# Patient Record
Sex: MALE | Race: AMERICAN INDIAN OR ALASKA NATIVE | NOT HISPANIC OR LATINO | Employment: FULL TIME | URBAN - METROPOLITAN AREA
[De-identification: names, ages, dates, MRNs, and addresses within clinical notes are randomized per-mention and may not be internally consistent; named-entity substitution may affect disease eponyms.]

---

## 2017-11-15 ENCOUNTER — COMMUNICATION - HEALTHEAST (OUTPATIENT)
Dept: TELEHEALTH | Facility: CLINIC | Age: 37
End: 2017-11-15

## 2017-11-15 ENCOUNTER — OFFICE VISIT - HEALTHEAST (OUTPATIENT)
Dept: INTERNAL MEDICINE | Facility: CLINIC | Age: 37
End: 2017-11-15

## 2017-11-15 DIAGNOSIS — M10.9 GOUT, UNSPECIFIED CAUSE, UNSPECIFIED CHRONICITY, UNSPECIFIED SITE: ICD-10-CM

## 2017-11-15 LAB
CHOLEST SERPL-MCNC: 249 MG/DL
FASTING STATUS PATIENT QL REPORTED: YES
HDLC SERPL-MCNC: 55 MG/DL
LDLC SERPL CALC-MCNC: 174 MG/DL
TRIGL SERPL-MCNC: 102 MG/DL

## 2017-11-16 ENCOUNTER — COMMUNICATION - HEALTHEAST (OUTPATIENT)
Dept: INTERNAL MEDICINE | Facility: CLINIC | Age: 37
End: 2017-11-16

## 2020-06-02 ENCOUNTER — COMMUNICATION - HEALTHEAST (OUTPATIENT)
Dept: SCHEDULING | Facility: CLINIC | Age: 40
End: 2020-06-02

## 2020-06-03 ENCOUNTER — OFFICE VISIT - HEALTHEAST (OUTPATIENT)
Dept: INTERNAL MEDICINE | Facility: CLINIC | Age: 40
End: 2020-06-03

## 2020-06-03 DIAGNOSIS — M1A.0710 CHRONIC IDIOPATHIC GOUT INVOLVING TOE OF RIGHT FOOT WITHOUT TOPHUS: ICD-10-CM

## 2020-06-03 DIAGNOSIS — M10.9 URIC ACID ARTHROPATHY: ICD-10-CM

## 2020-06-03 DIAGNOSIS — E55.9 VITAMIN D DEFICIENCY: ICD-10-CM

## 2020-06-03 ASSESSMENT — PATIENT HEALTH QUESTIONNAIRE - PHQ9: SUM OF ALL RESPONSES TO PHQ QUESTIONS 1-9: 2

## 2021-05-27 ASSESSMENT — PATIENT HEALTH QUESTIONNAIRE - PHQ9: SUM OF ALL RESPONSES TO PHQ QUESTIONS 1-9: 2

## 2021-05-31 VITALS — WEIGHT: 199.7 LBS

## 2021-06-08 NOTE — TELEPHONE ENCOUNTER
Patient calls today concerned that he is starting to develop gout flares. States he's had problems with gout flare off and on for about 10 years. His last flare noted in his electronic chart was 11/15/2017. He was seen at an office visit at that time with Dr. Frances for gout in the big toe. Was prescribed allopurinol 300mg daily and Naproxen 2xdaily for 7 days at that time. Patient states he did not understand that he was supposed to take those mediations daily. He has taken them infrequently as needed over the last 2 years whenever he feels he is getting a gout flare, states that has worked well for him.    Today he states he is just about out of the naproxen and allopurinol. He feels like he may be starting to get gout flare in R ankle. He does have some mild pain/tenderness in the R ankle joint at this time. Denies additional symptoms. No fevers noted. Asks to have those medications refilled again.    I advised patient that a visit is usually recommended before prescribing new medications. The provider will assess his symptoms and decide the best plan of care at this time. Scheduled video visit 6/3/20 at 1130am with Dr. Frances.    Verbalizes understanding of nurse advice.    Micki Nicole RN        Reason for Disposition    Patient wants to be seen    Additional Information    Negative: Followed an ankle or foot injury    Negative: Ankle pain is the main symptom    Negative: Entire foot is cool or blue in comparison to other foot    Negative: Purple or black skin on foot or toe    Negative: Red area or streak and fever    Negative: Swollen foot and fever    Negative: Patient sounds very sick or weak to the triager    Negative: SEVERE pain (e.g., excruciating, unable to do any normal activities)    Negative: Looks like a boil, infected sore, deep ulcer, or other infected rash (spreading redness, pus)    Negative: Swollen foot (EXCEPTIONs: localized bump from bunions, calluses, insect bite, sting)     "Negative: Numbness in one foot (i.e., loss of sensation)    Negative: MODERATE pain (e.g., interferes with normal activities, limping) and present > 3 days    Negative: Numbness or tingling in feet and new or increased    Negative: Pain in the big toe joint    Answer Assessment - Initial Assessment Questions  1. ONSET: \"When did the pain start?\"       Gradually been getting worse the past few days. Suspects gout flare.  2. LOCATION: \"Where is the pain located?\"       Right ankle area.  3. PAIN: \"How bad is the pain?\"    (Scale 1-10; or mild, moderate, severe)    -  MILD (1-3): doesn't interfere with normal activities     -  MODERATE (4-7): interferes with normal activities (e.g., work or school) or awakens from sleep, limping     -  SEVERE (8-10): excruciating pain, unable to do any normal activities, unable to walk      Mild to moderate.  4. WORK OR EXERCISE: \"Has there been any recent work or exercise that involved this part of the body?\"       denies  5. CAUSE: \"What do you think is causing the foot pain?\"      Patient states he has recurrent gout. Believes this is a gout flare.  6. OTHER SYMPTOMS: \"Do you have any other symptoms?\" (e.g., leg pain, rash, fever, numbness)      Denies  7. PREGNANCY: \"Is there any chance you are pregnant?\" \"When was your last menstrual period?\"      no    Protocols used: FOOT PAIN-A-[place]      "

## 2021-06-08 NOTE — PROGRESS NOTES
Patient would like the video invitation sent by: Send to e-mail at: [email or web address]      ASSESSMENT:  1. 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  Recommend 1 month trial of allopurinol.  Consider daily allopurinol  - allopurinoL (ZYLOPRIM) 300 MG tablet; Take 1 tablet (300 mg total) by mouth daily.  Dispense: 90 tablet; Refill: 3  - naproxen (NAPROSYN) 500 MG tablet; Take 1 tablet (500 mg total) by mouth 2 (two) times a day with meals.  Dispense: 60 tablet; Refill: 3  - predniSONE (DELTASONE) 20 MG tablet; Take 20 mg by mouth daily.  Dispense: 30 tablet; Refill: 1    2. Uric acid arthropathy  Question component of uric acid arthropathy and other joints  - allopurinoL (ZYLOPRIM) 300 MG tablet; Take 1 tablet (300 mg total) by mouth daily.  Dispense: 90 tablet; Refill: 3  - naproxen (NAPROSYN) 500 MG tablet; Take 1 tablet (500 mg total) by mouth 2 (two) times a day with meals.  Dispense: 60 tablet; Refill: 3  - predniSONE (DELTASONE) 20 MG tablet; Take 20 mg by mouth daily.  Dispense: 30 tablet; Refill: 1    3. Vitamin D deficiency  Recommend increased replacement  - cholecalciferol, vitamin D3, 50 mcg (2,000 unit) Tab; Take 1 tablet (2,000 Units total) by mouth daily.  Dispense: 100 each; Refill: 3    Preventive Health Care: Otherwise up-to-date    PLAN:  Patient Instructions   Refill naproxen, prednisone and allopurinol    Considerably allopurinol to prevent attacks of gout    Recommend 1 month trial of allopurinol 300 mg daily to determine degree of uric acid pain    Naproxen 500 mg twice daily as overlapped for 1 month trial (for 1 week)    Vitamin D 2000 to 5000 units daily    No need for blood work    MyChart update as needed      No orders of the defined types were placed in this encounter.    Return if symptoms worsen or fail to improve.    CHIEF COMPLAINT:  Chief Complaint   Patient presents with      "Follow-up     gout and meds       HISTORY OF PRESENT ILLNESS:  Naresh is a 39 y.o. male contacting the clinic today via video for complaints of recurrent gout.  He was last seen in 2017.  At that time he was prescribed allopurinol preventatively, with prednisone and naproxen for short-term.  He reports having 3-4 attacks per year.  He is predominantly involved his left big toe but a recent attack involved his knee.  He also complains of occasional neck pain, back pain, and hand pain which he blames on various other things.  He has never had side effects on the prednisone or naproxen    REVIEW OF SYSTEMS:   No asthma.  No high blood pressure.  All other systems are negative.    PFSH:  Lives at home with his wife.  Used to work construction    TOBACCO USE:  Social History     Tobacco Use   Smoking Status Never Smoker   Smokeless Tobacco Never Used       VITALS:  Stated Blood Pressure    Stated Pulse    Stated Weight 198lb    PHYSICAL EXAM:  (observations via Video)  Well-groomed and appropriate sitting at his computer.      ADDITIONAL HISTORY SUMMARIZED (2): Reviewed office note 2017.  CARE EVERYWHERE/ EXTRA INFORMATION (1):   RADIOLOGY TESTS (1):   LABS (1): Elevated uric acid and low vitamin D 2017  CARDIOLOGY/MEDICINE TESTS (1):   INDEPENDENT REVIEW (2 each):     Total data points: 3    Video Start time: 11:52 AM  Video End time: 12:27 PM    The visit lasted a total of 35 minutes face to face    CA intake time  4 minutes      The patient has been notified of following:     \"This video visit will be conducted via a call between you and your physician/provider. We have found that certain health care needs can be provided without the need for an in-person physical exam.  This service lets us provide the care you need with a video conversation.  If a prescription is necessary we can send it directly to your pharmacy.  If lab work is needed we can place an order for that and you can then stop by our lab to have the test " "done at a later time.    Video visits are billed at different rates depending on your insurance coverage. Please reach out to your insurance provider with any questions.    If during the course of the call the physician/provider feels a video visit is not appropriate, you will not be charged for this service.\"    Patient has given verbal consent to a Video visit? Yes    Patient would like to receive their AVS by AVS Preference: MyChart.        Video-Visit Details    Type of service:  Video Visit    Originating Location (pt. Location): Home    Distant Location (provider location):  [facility] INTERNAL MEDICINE     Mode of Communication:  Video Conference via [facility]    Fransisco Frances MD     "

## 2021-06-08 NOTE — PATIENT INSTRUCTIONS - HE
Refill naproxen, prednisone and allopurinol    Considerably allopurinol to prevent attacks of gout    Recommend 1 month trial of allopurinol 300 mg daily to determine degree of uric acid pain    Naproxen 500 mg twice daily as overlapped for 1 month trial (for 1 week)    Vitamin D 2000 to 5000 units daily    No need for blood work    MyChart update as needed

## 2021-06-14 NOTE — PROGRESS NOTES
ASSESSMENT:  1. Gout, unspecified cause, unspecified chronicity, unspecified site  History of recurrent gout.  Strong family history.  Vague history of perhaps excess alcohol use.  Reviewed urgent care notes.  Needs full metabolic workup and uric acid level.  Needs both active treatment and prophylactic allopurinol  - Comprehensive Metabolic Panel  - Lipid Cascade  - Thyroid Stimulating Hormone (TSH)  - Vitamin D, Total (25-Hydroxy)  - Uric Acid  - Urinalysis-UC if Indicated  - Vitamin B12    Routine health maintenance discussed.  Otherwise very healthy    PLAN:  Patient Instructions   Allopurinol 300 mgs daily to lower uric production and prevent attacks    Naproxen 500 mgs twice a day with food for 7-10 days, or 2 days after all symptoms gone.  Can cause stomach upset    Prednisone, steroid, 20 mgs once a day for one week for the intlammation.  Hyper, trouble sleeping.  Decongest your cold    Blood work to evaluate further    Sign up for mychart        Orders Placed This Encounter   Procedures     Comprehensive Metabolic Panel     Lipid Cascade     Order Specific Question:   Fasting is required?     Answer:   Unknown     Thyroid Stimulating Hormone (TSH)     Vitamin D, Total (25-Hydroxy)     Uric Acid     Urinalysis-UC if Indicated     Vitamin B12     There are no discontinued medications.    No Follow-up on file.    CHIEF COMPLAINT:  Chief Complaint   Patient presents with     Gout     acute attack in right ankle x 1 month        HISTORY OF PRESENT ILLNESS:  Naresh is a 37 y.o. male new patient presenting to the clinic today with complaints of gout. He is not seen by a doctor on a regular basis.     Gout: He has had trouble with gout since his 20's but has learned how to manage his symptoms fairly well through diet. In the past ten years, he has only had three bad attacks. About a month ago, he had strep throat and was treated with antibiotics at Urgent Care. After that, he developed a gout flare in his left great  toe. He was seen at Urgent Care again on 10/27 and was given colchicine and indomethacin. That helped clear up the gout in his toe, but then he developed a head cold about ten days ago. He thinks that played a part in him developing another gout flare in his right ankle that he is currently dealing with. It started to get sore about a week ago, but the past two days have been severe. He has barely been able to walk. His gout has almost always been in his great toes in the past, occasionally in the thumb, but he has never had it in his ankle. He has two uncles that also had trouble with gout in their 20's. He thinks he has had his uric acid level checked in the past, but he does not recall what it was. He knows that red meat and beer are triggers for his gout, so he tries to limit those, but he still drinks. He took a couple of hydrocodone last night, which was helpful in relieving his pain. He has been taking ibuprofen regularly as well, which has only been minimally helpful. He takes two tablets of ibuprofen every six hours or so.     URI: He has had symptoms of a head cold for the past ten days or so. He has sinus pressure and a cough. His throat is only minimally sore. It is different than when he had strep throat a month ago.      REVIEW OF SYSTEMS:   He does not have any other chronic conditions. He does not have any bowel problems. He does not have asthma or arthritis. He had a rash three years ago, but that was not a long term problem. He denies trouble with urination. All other systems are negative.    PFSH:  He has never had surgery other than a vasectomy. There is no family history of kidney stones. His parents and sister are healthy. He has a grandmother with diabetes. He averages about a drink of alcohol per day. He has a six year old son. He does user interface design for work. It has been stressful lately, but he enjoys it.     History   Smoking Status     Never Smoker   Smokeless Tobacco     Never  Used       Family History   Problem Relation Age of Onset     Throat cancer Maternal Grandfather      Heart attack Paternal Grandfather        Social History     Social History     Marital status:      Spouse name: N/A     Number of children: N/A     Years of education: N/A     Occupational History     Not on file.     Social History Main Topics     Smoking status: Never Smoker     Smokeless tobacco: Never Used     Alcohol use 3.6 oz/week     6 Standard drinks or equivalent per week     Drug use: No     Sexual activity: Yes     Partners: Female     Other Topics Concern     Not on file     Social History Narrative     No narrative on file       Past Surgical History:   Procedure Laterality Date     VASECTOMY  11/2012       Allergies   Allergen Reactions     Sulfa (Sulfonamide Antibiotics)        Active Ambulatory Problems     Diagnosis Date Noted     Testicular Hydrocele      Resolved Ambulatory Problems     Diagnosis Date Noted     No Resolved Ambulatory Problems     No Additional Past Medical History       VITALS:  Vitals:    11/15/17 1201   BP: 122/78   Patient Site: Left Arm   Patient Position: Sitting   Cuff Size: Adult Regular   Pulse: 72   Weight: 199 lb 11.2 oz (90.6 kg)     Wt Readings from Last 3 Encounters:   11/15/17 199 lb 11.2 oz (90.6 kg)     There is no height or weight on file to calculate BMI.    PHYSICAL EXAM:  Constitutional:  Reveals an alert, pleasant, talkative, young man. Walks with pronounced limp favoring right ankle.  Vitals:  Per nursing notes.  Ears:  Clear.  Oropharynx:  Without posterior nasal drainage or thrush. No exudate.   Neck:  Supple, thyroid not palpable. No lymph adenopathy.   Cardiac:  Regular rate and rhythm without murmurs, rubs, or gallops. Legs without edema. Palpation of the radial pulse regular.  Lungs: Clear.  Respiratory effort normal.  Abdomen:   Bowel sounds positive, nontender, nondistended.  Neither liver nor spleen palpable.  Skin:   Without rash, bruise,  or palpable lesions.  Rheumatologic: Circumferential swelling of right ankle. Pain with plantar flexion and palpation around ankle.   Neurologic:  Cranial nerves II-XII intact.     Psychiatric:  Mood appropriate, memory intact.     MEDICATIONS:  Current Outpatient Prescriptions   Medication Sig Dispense Refill     allopurinol (ZYLOPRIM) 300 MG tablet Take 1 tablet (300 mg total) by mouth daily. 90 tablet 3     naproxen (NAPROSYN) 500 MG tablet Take 1 tablet (500 mg total) by mouth 2 (two) times a day with meals. 60 tablet 3     predniSONE (DELTASONE) 20 MG tablet Take 1 tablet (20 mg total) by mouth daily for 7 days. 7 tablet 3     No current facility-administered medications for this visit.        ADDITIONAL HISTORY SUMMARIZED (2): Reviewed 10/17 and 10/27 [facility] notes regarding strep and gout. Reviewed 10/7/2014 Dr. Valencia note regarding testicular lump.  DECISION TO OBTAIN EXTRA INFORMATION (1): Care Everywhere accessed  RADIOLOGY TESTS (1): Reviewed 10/27 X-ray   LABS (1): Labs from 10/7/2014 reviewed. Reviewed 10/17 strep test from [facility]. Labs ordered.   MEDICINE TESTS (1): None.  INDEPENDENT REVIEW (2 each): None.     The visit lasted a total of 31 minutes face to face with the patient. Over 50% of the time was spent counseling and educating the patient about his gout.    I, Austen Stewart, am scribing for and in the presence of, Dr. Frances.    I, Dr. Frances, personally performed the services described in this documentation, as scribed by Austen Stewart in my presence, and it is both accurate and complete.    Total data points: 5

## 2021-08-21 ENCOUNTER — HEALTH MAINTENANCE LETTER (OUTPATIENT)
Age: 41
End: 2021-08-21

## 2021-08-25 ENCOUNTER — VIRTUAL VISIT (OUTPATIENT)
Dept: INTERNAL MEDICINE | Facility: CLINIC | Age: 41
End: 2021-08-25
Payer: COMMERCIAL

## 2021-08-25 DIAGNOSIS — Z11.59 ENCOUNTER FOR HEPATITIS C SCREENING TEST FOR LOW RISK PATIENT: ICD-10-CM

## 2021-08-25 DIAGNOSIS — Z13.220 SCREENING CHOLESTEROL LEVEL: ICD-10-CM

## 2021-08-25 DIAGNOSIS — Z11.4 SCREENING FOR HUMAN IMMUNODEFICIENCY VIRUS: ICD-10-CM

## 2021-08-25 DIAGNOSIS — M10.9 URIC ACID ARTHROPATHY: ICD-10-CM

## 2021-08-25 DIAGNOSIS — E55.9 VITAMIN D DEFICIENCY: ICD-10-CM

## 2021-08-25 DIAGNOSIS — R79.89 LOW VITAMIN B12 LEVEL: ICD-10-CM

## 2021-08-25 DIAGNOSIS — M1A.0710 CHRONIC IDIOPATHIC GOUT INVOLVING TOE OF RIGHT FOOT WITHOUT TOPHUS: Primary | ICD-10-CM

## 2021-08-25 PROCEDURE — 99214 OFFICE O/P EST MOD 30 MIN: CPT | Mod: GT | Performed by: INTERNAL MEDICINE

## 2021-08-25 RX ORDER — NAPROXEN 500 MG/1
500 TABLET ORAL 2 TIMES DAILY PRN
Start: 2021-08-25 | End: 2021-11-27

## 2021-08-25 RX ORDER — ALLOPURINOL 100 MG/1
300 TABLET ORAL DAILY
COMMUNITY
End: 2021-08-25

## 2021-08-25 RX ORDER — NAPROXEN 500 MG/1
500 TABLET ORAL 2 TIMES DAILY WITH MEALS
COMMUNITY
End: 2021-08-25

## 2021-08-25 RX ORDER — ALLOPURINOL 300 MG/1
300 TABLET ORAL DAILY
Qty: 90 TABLET | Refills: 3 | Status: SHIPPED | OUTPATIENT
Start: 2021-08-25 | End: 2022-08-25

## 2021-08-25 NOTE — PROGRESS NOTES
Naresh is a 41 year old male contacting the clinic today via video, who will use the platform: [employer] for the visit.  Phone # for [employer], or if Amwell drops:   Telephone Information:   Mobile [phone]          ASSESSMENT:  DX: 1. Chronic idiopathic gout involving toe of right foot without tophus  Excellent control with daily allopurinol  - allopurinol (ZYLOPRIM) 300 MG tablet; Take 1 tablet (300 mg) by mouth daily  Dispense: 90 tablet; Refill: 3  - Comprehensive metabolic panel; Future  - Uric acid; Future  - naproxen (NAPROSYN) 500 MG tablet; Take 1 tablet (500 mg) by mouth 2 times daily as needed for moderate pain    2. Uric acid arthropathy  Control of knee pain with daily allopurinol.  Discussed possible further adjustment  - allopurinol (ZYLOPRIM) 300 MG tablet; Take 1 tablet (300 mg) by mouth daily  Dispense: 90 tablet; Refill: 3  - Uric acid; Future  - Erythrocyte sedimentation rate auto; Future  - CBC with platelets; Future    3. Vitamin D deficiency  - Vitamin D Deficiency; Future    4. Screening cholesterol level  - Lipid panel reflex to direct LDL Fasting; Future    5. Low vitamin B12 level  - Vitamin B12; Future    6. Screening for human immunodeficiency virus  - HIV Antigen Antibody Combo; Future    7. Encounter for hepatitis C screening test for low risk patient  - Hepatitis C antibody; Future     Preventive Care Assessed: No history of colon cancer or prostate cancer.  Update labs.  Vaccinations reviewed    PLAN:  Patient Instructions   Continue allopurinol 300 mg daily    Use naproxen as needed    Update blood     Return in about 1 year (around 8/25/2022).    CHIEF COMPLAINT:  Chief Complaint   Patient presents with     RECHECK     med refill         HISTORY OF PRESENT ILLNESS:  Naresh is a 41 year old male contacting the clinic today via video for review of allopurinol.  When we talked last year he had been having attacks of gout.  He was advised to take allopurinol daily and to see if this  helped any of his other symptoms.  He noticed marked improvement in knee pain and prevention of gout attacks.  He ran out a few weeks ago and has noticed worsening symptoms.  He otherwise feels well    REVIEW OF SYSTEMS:   Healthy    PFSH:  Social History     Social History Narrative    Self employed software design        , 1 son       TOBACCO USE:  History   Smoking Status     Never Smoker   Smokeless Tobacco     Never Used       VITALS:  There were no vitals filed for this visit.  There were no vitals taken for this visit. There is no height or weight on file to calculate BMI.    PHYSICAL EXAM:  (observations via Video)  Alert and oriented.  Tattoo right arm    MEDICATIONS:   Current Outpatient Medications   Medication Sig Dispense Refill     allopurinol (ZYLOPRIM) 300 MG tablet Take 1 tablet (300 mg) by mouth daily 90 tablet 3     cholecalciferol, vitamin D3, 50 mcg (2,000 unit) Tab [CHOLECALCIFEROL, VITAMIN D3, 50 MCG (2,000 UNIT) TAB] Take 1 tablet (2,000 Units total) by mouth daily. 100 each 3     naproxen (NAPROSYN) 500 MG tablet Take 1 tablet (500 mg) by mouth 2 times daily as needed for moderate pain         Outside Notes summarized:   Labs, x-rays, cardiology, GI tests reviewed:   No results for input(s): HGB, NA, POTASSIUM, CR, A1C, PSA, TSH in the last 62401 hours.  New orders:   Orders Placed This Encounter   Procedures     Comprehensive metabolic panel     Uric acid     Vitamin B12     Vitamin D Deficiency     Erythrocyte sedimentation rate auto     CBC with platelets     Lipid panel reflex to direct LDL Fasting     Hepatitis C antibody     HIV Antigen Antibody Combo       Independent review of:    Supplemental history by:      Patient has given verbal consent to a Video visit?  Yes  How would you like to obtain your AVS?  MyChart  Will anyone else be joining your video visit? No       Video-Visit Details  Type of service:  Video Visit  Originating Location (pt. Location): Home  Distant  Location (provider location):   [facility]    Fransisco Frances MD  [facility]    Video Start Time: 12:04 PM  Video End time:  12:30 PM  Face to face plus orders: 26 minutes  Documentation time:  3 minutes     The visit lasted a total of 27 minutes

## 2021-09-07 ENCOUNTER — LAB (OUTPATIENT)
Dept: LAB | Facility: CLINIC | Age: 41
End: 2021-09-07
Payer: COMMERCIAL

## 2021-09-07 DIAGNOSIS — Z11.59 ENCOUNTER FOR HEPATITIS C SCREENING TEST FOR LOW RISK PATIENT: ICD-10-CM

## 2021-09-07 DIAGNOSIS — Z13.220 SCREENING CHOLESTEROL LEVEL: ICD-10-CM

## 2021-09-07 DIAGNOSIS — Z11.4 SCREENING FOR HUMAN IMMUNODEFICIENCY VIRUS: ICD-10-CM

## 2021-09-07 DIAGNOSIS — M1A.0710 CHRONIC IDIOPATHIC GOUT INVOLVING TOE OF RIGHT FOOT WITHOUT TOPHUS: ICD-10-CM

## 2021-09-07 DIAGNOSIS — E55.9 VITAMIN D DEFICIENCY: ICD-10-CM

## 2021-09-07 DIAGNOSIS — M10.9 URIC ACID ARTHROPATHY: ICD-10-CM

## 2021-09-07 DIAGNOSIS — R79.89 LOW VITAMIN B12 LEVEL: ICD-10-CM

## 2021-09-07 LAB
ALBUMIN SERPL-MCNC: 4.4 G/DL (ref 3.5–5)
ALP SERPL-CCNC: 52 U/L (ref 45–120)
ALT SERPL W P-5'-P-CCNC: 35 U/L (ref 0–45)
ANION GAP SERPL CALCULATED.3IONS-SCNC: 15 MMOL/L (ref 5–18)
AST SERPL W P-5'-P-CCNC: 27 U/L (ref 0–40)
BILIRUB SERPL-MCNC: 2 MG/DL (ref 0–1)
BUN SERPL-MCNC: 11 MG/DL (ref 8–22)
CALCIUM SERPL-MCNC: 10.6 MG/DL (ref 8.5–10.5)
CHLORIDE BLD-SCNC: 102 MMOL/L (ref 98–107)
CHOLEST SERPL-MCNC: 275 MG/DL
CO2 SERPL-SCNC: 25 MMOL/L (ref 22–31)
CREAT SERPL-MCNC: 0.93 MG/DL (ref 0.7–1.3)
ERYTHROCYTE [DISTWIDTH] IN BLOOD BY AUTOMATED COUNT: 12.3 % (ref 10–15)
ERYTHROCYTE [SEDIMENTATION RATE] IN BLOOD BY WESTERGREN METHOD: 13 MM/HR (ref 0–15)
GFR SERPL CREATININE-BSD FRML MDRD: >90 ML/MIN/1.73M2
GLUCOSE BLD-MCNC: 93 MG/DL (ref 70–125)
HCT VFR BLD AUTO: 46.6 % (ref 40–53)
HDLC SERPL-MCNC: 81 MG/DL
HGB BLD-MCNC: 15.6 G/DL (ref 13.3–17.7)
HIV 1+2 AB+HIV1 P24 AG SERPL QL IA: NEGATIVE
LDLC SERPL CALC-MCNC: 162 MG/DL
MCH RBC QN AUTO: 30.8 PG (ref 26.5–33)
MCHC RBC AUTO-ENTMCNC: 33.5 G/DL (ref 31.5–36.5)
MCV RBC AUTO: 92 FL (ref 78–100)
PLATELET # BLD AUTO: 280 10E3/UL (ref 150–450)
POTASSIUM BLD-SCNC: 4.6 MMOL/L (ref 3.5–5)
PROT SERPL-MCNC: 8.6 G/DL (ref 6–8)
RBC # BLD AUTO: 5.07 10E6/UL (ref 4.4–5.9)
SODIUM SERPL-SCNC: 142 MMOL/L (ref 136–145)
TRIGL SERPL-MCNC: 159 MG/DL
URATE SERPL-MCNC: 6 MG/DL (ref 3–8)
VIT B12 SERPL-MCNC: 406 PG/ML (ref 213–816)
WBC # BLD AUTO: 5.2 10E3/UL (ref 4–11)

## 2021-09-07 PROCEDURE — 82607 VITAMIN B-12: CPT

## 2021-09-07 PROCEDURE — 85652 RBC SED RATE AUTOMATED: CPT

## 2021-09-07 PROCEDURE — 80053 COMPREHEN METABOLIC PANEL: CPT

## 2021-09-07 PROCEDURE — 80061 LIPID PANEL: CPT

## 2021-09-07 PROCEDURE — 82306 VITAMIN D 25 HYDROXY: CPT

## 2021-09-07 PROCEDURE — 87389 HIV-1 AG W/HIV-1&-2 AB AG IA: CPT

## 2021-09-07 PROCEDURE — 85027 COMPLETE CBC AUTOMATED: CPT

## 2021-09-07 PROCEDURE — 36415 COLL VENOUS BLD VENIPUNCTURE: CPT

## 2021-09-07 PROCEDURE — 84550 ASSAY OF BLOOD/URIC ACID: CPT

## 2021-09-07 PROCEDURE — 86803 HEPATITIS C AB TEST: CPT

## 2021-09-08 LAB
DEPRECATED CALCIDIOL+CALCIFEROL SERPL-MC: 36 UG/L (ref 30–80)
HCV AB SERPL QL IA: NEGATIVE

## 2021-10-16 ENCOUNTER — HEALTH MAINTENANCE LETTER (OUTPATIENT)
Age: 41
End: 2021-10-16

## 2021-11-27 ENCOUNTER — MYC REFILL (OUTPATIENT)
Dept: INTERNAL MEDICINE | Facility: CLINIC | Age: 41
End: 2021-11-27
Payer: COMMERCIAL

## 2021-11-27 DIAGNOSIS — M1A.0710 CHRONIC IDIOPATHIC GOUT INVOLVING TOE OF RIGHT FOOT WITHOUT TOPHUS: ICD-10-CM

## 2021-11-29 RX ORDER — NAPROXEN 500 MG/1
500 TABLET ORAL 2 TIMES DAILY PRN
Qty: 60 TABLET | Refills: 11 | Status: SHIPPED | OUTPATIENT
Start: 2021-11-29 | End: 2022-12-06

## 2021-11-29 NOTE — TELEPHONE ENCOUNTER
Disp Refills Start End AIME   naproxen (NAPROSYN) 500 MG tablet   2021  No   Sig - Route: Take 1 tablet (500 mg) by mouth 2 times daily as needed for moderate pain - Oral   Class: No Print Out   Order: [id number]     CLASS:  No print.  Routing to provider per protocol.    Disp Refills Start End AIME    naproxen (NAPROSYN) 500 MG tablet 60 tablet 3 6/3/2020 7/3/2020 No   Sig - Route: Take 1 tablet (500 mg total) by mouth 2 (two) times a day with meals. - Oral   Sent to pharmacy as: naproxen 500 mg tablet (NAPROSYN)   E-Prescribing Status: Receipt confirmed by pharmacy (6/3/2020 12:28 PM CDT)       naproxen (NAPROSYN) 500 MG tablet [[id number]]    Electronically signed by: Fransisco Frances MD on 20 Status:    Ordering user: Fransisco Frances MD 20 Authorized by: Fransisco Frances MD   Frequency: BID with meals 20 - 30  days   Diagnoses  Chronic idiopathic gout involving toe of right foot without tophus [M1A.0710]  Uric acid arthropathy [M10.9]       Routing refill request to provider for review/approval because:  BP not current   script    Last office visit provider:  21     Requested Prescriptions   Pending Prescriptions Disp Refills     naproxen (NAPROSYN) 500 MG tablet       Sig: Take 1 tablet (500 mg) by mouth 2 times daily as needed for moderate pain       NSAID Medications Failed - 2021 11:24 AM        Failed - Blood pressure under 140/90 in past 12 months     BP Readings from Last 3 Encounters:   No data found for BP                 Passed - Normal ALT on file in past 12 months     Recent Labs   Lab Test 21  1529   ALT 35             Passed - Normal AST on file in past 12 months     Recent Labs   Lab Test 21  1529   AST 27             Passed - Recent (12 mo) or future (30 days) visit within the authorizing provider's specialty     Patient has had an office visit with the authorizing provider or a provider within the authorizing  "providers department within the previous 12 mos or has a future within next 30 days. See \"Patient Info\" tab in inbasket, or \"Choose Columns\" in Meds & Orders section of the refill encounter.              Passed - Patient is age 6-64 years        Passed - Normal CBC on file in past 12 months     Recent Labs   Lab Test 09/07/21  1529   WBC 5.2   RBC 5.07   HGB 15.6   HCT 46.6                    Passed - Medication is active on med list        Passed - Normal serum creatinine on file in past 12 months     Recent Labs   Lab Test 09/07/21  1529   CR 0.93       Ok to refill medication if creatinine is low               Damon Calles RN 11/29/21 10:06 AM  "

## 2021-12-22 ENCOUNTER — E-VISIT (OUTPATIENT)
Dept: INTERNAL MEDICINE | Facility: CLINIC | Age: 41
End: 2021-12-22
Payer: COMMERCIAL

## 2021-12-22 DIAGNOSIS — Z20.822 SUSPECTED COVID-19 VIRUS INFECTION: ICD-10-CM

## 2021-12-22 DIAGNOSIS — R09.81 NASAL CONGESTION: ICD-10-CM

## 2021-12-22 DIAGNOSIS — R05.9 COUGH: Primary | ICD-10-CM

## 2021-12-22 DIAGNOSIS — J06.9 VIRAL URI: ICD-10-CM

## 2021-12-22 PROCEDURE — 99421 OL DIG E/M SVC 5-10 MIN: CPT | Performed by: INTERNAL MEDICINE

## 2021-12-22 RX ORDER — BENZONATATE 200 MG/1
200 CAPSULE ORAL 3 TIMES DAILY PRN
Qty: 20 CAPSULE | Refills: 1 | Status: SHIPPED | OUTPATIENT
Start: 2021-12-22 | End: 2022-04-25

## 2021-12-22 RX ORDER — PREDNISONE 20 MG/1
20 TABLET ORAL DAILY
Qty: 7 TABLET | Refills: 0 | Status: SHIPPED | OUTPATIENT
Start: 2021-12-22 | End: 2023-01-18

## 2021-12-22 RX ORDER — GUAIFENESIN 600 MG/1
600 TABLET, EXTENDED RELEASE ORAL 2 TIMES DAILY
Qty: 60 TABLET | Refills: 2 | Status: SHIPPED | OUTPATIENT
Start: 2021-12-22 | End: 2022-04-25

## 2021-12-22 NOTE — TELEPHONE ENCOUNTER
Provider E-Visit time total (minutes): 7    2:03 PM    Message sent.  Flu and Covid swab ordered.  Prednisone, guaifenesin, and Tessalon sent

## 2021-12-22 NOTE — PATIENT INSTRUCTIONS
Dear Naresh Velasco    After reviewing your responses, I've been able to diagnose you with?an upper respiratory infection (common cold).?     Based on your responses and diagnosis, I have prescribed prednisone and guaifenesin and cough medicine to treat your symptoms. I have sent this to your pharmacy.?     It is also important to stay well hydrated, get lots of rest and take over-the-counter decongestants,?tylenol?or ibuprofen if you?are able to?take those medications per your primary care provider to help relieve discomfort.?     It is important that you take?all of?your prescribed medication even if your symptoms are improving after a few doses.? Taking?all of?your medicine helps prevent the symptoms from returning.?     If your symptoms worsen, you develop severe headache, vomiting, high fever (>102), or are not improving in 7 days, please contact your primary care provider for an appointment or visit any of our convenient Walk-in Care or Urgent Care Centers to be seen which can be found on our website?here.?     Thanks again for choosing?us?as your health care partner,?   ?  Fransisco Frances MD?

## 2022-04-25 ENCOUNTER — VIRTUAL VISIT (OUTPATIENT)
Dept: INTERNAL MEDICINE | Facility: CLINIC | Age: 42
End: 2022-04-25
Payer: COMMERCIAL

## 2022-04-25 DIAGNOSIS — K82.8 GALLBLADDER SLUDGE: Primary | ICD-10-CM

## 2022-04-25 DIAGNOSIS — K76.0 FATTY LIVER: ICD-10-CM

## 2022-04-25 DIAGNOSIS — M10.9 URIC ACID ARTHROPATHY: ICD-10-CM

## 2022-04-25 DIAGNOSIS — N20.0 NEPHROLITHIASIS: ICD-10-CM

## 2022-04-25 DIAGNOSIS — Z00.00 PREVENTATIVE HEALTH CARE: ICD-10-CM

## 2022-04-25 DIAGNOSIS — M1A.0710 CHRONIC IDIOPATHIC GOUT INVOLVING TOE OF RIGHT FOOT WITHOUT TOPHUS: ICD-10-CM

## 2022-04-25 PROCEDURE — 99214 OFFICE O/P EST MOD 30 MIN: CPT | Mod: GT | Performed by: INTERNAL MEDICINE

## 2022-04-25 NOTE — PROGRESS NOTES
Naresh is a 41 year old male contacting the clinic today via video, who will use the platform: IMedExchange for the visit.  Phone # for Doximity, or if Amwell drops:   Telephone Information:   Mobile [phone]      (Pt will be in about 1:45)      ASSESSMENT and PLAN:  1. Gallbladder sludge  History and gallbladder findings suggest attack of cholelithiasis with strong family history.  Recommend surgical consultation to discuss whether cholecystectomy is needed  - Adult General Surg Referral; Future    2. Fatty liver  Noted.  Liver function tests normal    3. Preventative health care  - REVIEW OF HEALTH MAINTENANCE PROTOCOL ORDERS    4. Nephrolithiasis  Noted.    5. 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  May need higher dose    6. Uric acid arthropathy  As above     Patient Instructions   Discussed low-fat diet    Surgical referral to discuss cholecystectomy    Consider flexing dose of allopurinol    For gallbladder attacks take famotidine and naproxen     Medication list carefully reviewed and corrected  Epic Merger Problem list addressed and updated     Return in about 1 year (around 4/25/2023) for using a video visit.    CHIEF COMPLAINT:  Chief Complaint   Patient presents with     Follow Up     ER ([facility])        HISTORY OF PRESENT ILLNESS:  Naresh is a 41 year old male contacting the clinic today via video for review of emergency room.  He went to the [facility] emergency room on March 31 for upper abdominal pain with nausea and vomiting.  This occurred after going out to dinner to a Mexican restaurant.  Evaluation in the emergency room showed normal lipase, normal white count, normal liver tests, and an ultrasound showing gallbladder sludge.  Incidental note was also made of fatty liver and kidney stone    While in the emergency room he vomited.  He then experienced immediate and complete resolution of the pain and felt well thereafter.  His mother  had her gallbladder removed in her 30s and his father had his gallbladder removed in his 60s just recently    He takes allopurinol 1/day.  He has had no recurrent attacks of gout but if anything changes or he becomes a little bit sick, he will feel a flareup of joint pain in his feet and back.  Uric acid level in September was 6.0 on 300 mg    REVIEW OF SYSTEMS:   Occasional flares of back pain    PFSH:  Social History     Social History Narrative    Self employed software design        , 1 son       TOBACCO USE:  History   Smoking Status     Never Smoker   Smokeless Tobacco     Never Used       VITALS:  There were no vitals filed for this visit.  There were no vitals taken for this visit. There is no height or weight on file to calculate BMI.    PHYSICAL EXAM:  (observations via Video)  Alert and oriented    MEDICATIONS:   Current Outpatient Medications   Medication Sig Dispense Refill     allopurinol (ZYLOPRIM) 300 MG tablet Take 1 tablet (300 mg) by mouth daily 90 tablet 3     cholecalciferol, vitamin D3, 50 mcg (2,000 unit) Tab [CHOLECALCIFEROL, VITAMIN D3, 50 MCG (2,000 UNIT) TAB] Take 1 tablet (2,000 Units total) by mouth daily. 100 each 3     naproxen (NAPROSYN) 500 MG tablet Take 1 tablet (500 mg) by mouth 2 times daily as needed for moderate pain 60 tablet 11       Outside Notes summarized: ER note  Labs, x-rays, cardiology, GI tests reviewed:   Recent Labs   Lab Test 09/07/21  1529   HGB 15.6      POTASSIUM 4.6   CR 0.93   URIC 6.0   B12 406   VITDT 36     No results found for: [id number]  Lab Results   Component Value Date    VITDT 36 09/07/2021     Lab Results   Component Value Date    CHOL 275 09/07/2021     New orders:   Orders Placed This Encounter   Procedures     REVIEW OF HEALTH MAINTENANCE PROTOCOL ORDERS     Adult General Surg Referral       Independent review of:    Supplemental history by:      Patient has given verbal consent to a Video visit?  Yes  How would you like to obtain  your AVS?  MyChart  Will anyone else be joining your video visit? No       Video-Visit Details  Type of service:  Video Visit  Originating Location (pt. Location): Home  Distant Location (provider location):   [facility]    Fransisco Frances MD  [facility]    Video Start Time: 2:02 PM  Video End time:  2:36 PM  Face to face plus orders: 34 minutes  Documentation time:  3 minutes     The visit lasted a total of 37 minutes

## 2022-04-25 NOTE — PATIENT INSTRUCTIONS
Discussed low-fat diet    Surgical referral to discuss cholecystectomy    Consider flexing dose of allopurinol    For gallbladder attacks take famotidine and naproxen

## 2022-05-16 ENCOUNTER — OFFICE VISIT (OUTPATIENT)
Dept: SURGERY | Facility: CLINIC | Age: 42
End: 2022-05-16
Attending: INTERNAL MEDICINE
Payer: COMMERCIAL

## 2022-05-16 VITALS
DIASTOLIC BLOOD PRESSURE: 88 MMHG | SYSTOLIC BLOOD PRESSURE: 122 MMHG | BODY MASS INDEX: 27.98 KG/M2 | HEIGHT: 74 IN | WEIGHT: 218 LBS

## 2022-05-16 DIAGNOSIS — K82.8 GALLBLADDER SLUDGE: ICD-10-CM

## 2022-05-16 PROCEDURE — 99203 OFFICE O/P NEW LOW 30 MIN: CPT | Performed by: SPECIALIST

## 2022-05-16 RX ORDER — ACETAMINOPHEN 325 MG/1
975 TABLET ORAL ONCE
Status: CANCELLED | OUTPATIENT
Start: 2022-05-16 | End: 2022-05-16

## 2022-05-16 NOTE — PROGRESS NOTES
"Office Gallbladder Consult    HPI: I am consulted in this 41 year old male who has been experiencing some problems with abdominal pain. he has noted this for about one time. It is moderate to sever.  It is precipitated by diet. It is associated with nausea or vomiting.   It is not associated with chills or fevers. Here for consult about options for treatment.    Allergies:Sulfa (sulfonamide antibiotics) [sulfa drugs]    History reviewed. No pertinent past medical history.    Past Surgical History:   Procedure Laterality Date     VASECTOMY  11/2012       CURRENT MEDS:      Family History   Problem Relation Age of Onset     Throat cancer Maternal Grandfather      Coronary Artery Disease Paternal Grandfather         reports that he has never smoked. He has never used smokeless tobacco. He reports current alcohol use of about 6.0 standard drinks of alcohol per week. He reports that he does not use drugs.    Review of Systems - Pertinent items are noted in HPI.    /88   Ht 1.88 m (6' 2\")   Wt 98.9 kg (218 lb)   BMI 27.99 kg/m        EXAM:  GENERAL: This is a well developed male in no distress.  MOUTH: Mucus membranes moist  SKIN: Grossly intact  EYES: No icterus  CARDIAC: RRR w/out murmur  CHEST/LUNG: Clear  ABDOMEN: Soft, nontender RUQ non-tender, B/S present, Gatica's sign negative  BACK: No costovertebral tenderness  NEURO: Alert and oriented, nonfocal  PSYCHIATRIC: normal affect      LABS:  Lab Results   Component Value Date    WBC 5.2 09/07/2021    HGB 15.6 09/07/2021    HCT 46.6 09/07/2021    MCV 92 09/07/2021     09/07/2021       Lab Results   Component Value Date    ALT 35 09/07/2021    AST 27 09/07/2021    ALKPHOS 52 09/07/2021      Contains abnormal data Liver Panel (Hepatic Function Panel)  Order: [id number]   Ref Range & Units 1 mo ago   Alkaline Phosphatase 40 - 150 U/L 61    Bilirubin, Total 0.2 - 1.2 mg/dL 0.9    Bilirubin, Direct 0.0 - 0.5 mg/dL 0.3    AST (SGOT) 10 - 40 U/L 24    ALT " (SGPT) 0 - 55 U/L 35    Protein, Total 6.4 - 8.3 g/dL 8.6 High     Albumin 3.5 - 5.0 g/dL 3.9    Resulting Agency  [facility]   Specimen Collected: 03/31/22  8:59 AM Last Resulted: 03/31/22  9:36 AM   Received From: [facility]  Result Received: 04/25/22  7:13 AM         IMAGES:   Exam Information    Exam Date Exam Time Accession # Performing Department Results    3/31/22 12:00 AM [id number] [facility] External Imaging          PACS Images     Show images for US External Imaging Abdomen         Study Result    Narrative & Impression   Images were obtained from an external facility.  Click PACS Images hyperlink to view images.  Textual results have been scanned into the media tab.                       Assessment/Plan:     Pt with signs and symptoms consistent with symptomatic cholelithiasis. I have recommended a laparoscopic cholecystecomy. I discussed with him that we might not be able to do this laparoscopically with low risk of going open. I went over some of the risks of surgery including but not limited to bleeding, infection and bile duct injury and anesthesia.     Colin Ha MD ,MD  [facility] Department of Surgery

## 2022-05-16 NOTE — LETTER
"    5/16/2022         RE: Naresh Velasco  [address] [place] [address]        Dear Colleague,    Thank you for referring your patient, Naresh Velasco, to the [facility] AND [facility]ATRICS [facility] [place]. Please see a copy of my visit note below.    Office Gallbladder Consult    HPI: I am consulted in this 41 year old male who has been experiencing some problems with abdominal pain. he has noted this for about one time. It is moderate to sever.  It is precipitated by diet. It is associated with nausea or vomiting.   It is not associated with chills or fevers. Here for consult about options for treatment.    Allergies:Sulfa (sulfonamide antibiotics) [sulfa drugs]    History reviewed. No pertinent past medical history.    Past Surgical History:   Procedure Laterality Date     VASECTOMY  11/2012       CURRENT MEDS:      Family History   Problem Relation Age of Onset     Throat cancer Maternal Grandfather      Coronary Artery Disease Paternal Grandfather         reports that he has never smoked. He has never used smokeless tobacco. He reports current alcohol use of about 6.0 standard drinks of alcohol per week. He reports that he does not use drugs.    Review of Systems - Pertinent items are noted in HPI.    /88   Ht 1.88 m (6' 2\")   Wt 98.9 kg (218 lb)   BMI 27.99 kg/m        EXAM:  GENERAL: This is a well developed male in no distress.  MOUTH: Mucus membranes moist  SKIN: Grossly intact  EYES: No icterus  CARDIAC: RRR w/out murmur  CHEST/LUNG: Clear  ABDOMEN: Soft, nontender RUQ non-tender, B/S present, Gatica's sign negative  BACK: No costovertebral tenderness  NEURO: Alert and oriented, nonfocal  PSYCHIATRIC: normal affect      LABS:  Lab Results   Component Value Date    WBC 5.2 09/07/2021    HGB 15.6 09/07/2021    HCT 46.6 09/07/2021    MCV 92 09/07/2021     09/07/2021       Lab Results   Component Value Date    ALT 35 09/07/2021    AST 27 09/07/2021    ALKPHOS 52 " 09/07/2021      Contains abnormal data Liver Panel (Hepatic Function Panel)  Order: [id number]   Ref Range & Units 1 mo ago   Alkaline Phosphatase 40 - 150 U/L 61    Bilirubin, Total 0.2 - 1.2 mg/dL 0.9    Bilirubin, Direct 0.0 - 0.5 mg/dL 0.3    AST (SGOT) 10 - 40 U/L 24    ALT (SGPT) 0 - 55 U/L 35    Protein, Total 6.4 - 8.3 g/dL 8.6 High     Albumin 3.5 - 5.0 g/dL 3.9    Resulting Agency  [facility]   Specimen Collected: 03/31/22  8:59 AM Last Resulted: 03/31/22  9:36 AM   Received From: [facility]  Result Received: 04/25/22  7:13 AM         IMAGES:   Exam Information    Exam Date Exam Time Accession # Performing Department Results    3/31/22 12:00 AM [id number] [facility] External Imaging          PACS Images     Show images for US External Imaging Abdomen         Study Result    Narrative & Impression   Images were obtained from an external facility.  Click PACS Images hyperlink to view images.  Textual results have been scanned into the media tab.                       Assessment/Plan:     Pt with signs and symptoms consistent with symptomatic cholelithiasis. I have recommended a laparoscopic cholecystecomy. I discussed with him that we might not be able to do this laparoscopically with low risk of going open. I went over some of the risks of surgery including but not limited to bleeding, infection and bile duct injury and anesthesia.     Colin Ha MD ,MD  [facility] Department of Surgery      Again, thank you for allowing me to participate in the care of your patient.        Sincerely,        Colin Ha MD

## 2022-06-03 ENCOUNTER — TELEPHONE (OUTPATIENT)
Dept: SURGERY | Facility: CLINIC | Age: 42
End: 2022-06-03
Payer: COMMERCIAL

## 2022-09-06 ENCOUNTER — TELEPHONE (OUTPATIENT)
Dept: SURGERY | Facility: CLINIC | Age: 42
End: 2022-09-06

## 2022-09-27 ENCOUNTER — TELEPHONE (OUTPATIENT)
Dept: SURGERY | Facility: CLINIC | Age: 42
End: 2022-09-27

## 2022-09-27 NOTE — LETTER
Dear Mr. Velasco,    Thank you for choosing [facility] General Surgery for your clinic needs. It is our privilege to help our patients achieve and maintain the best care possible.    We have attempted to contact you to schedule surgery, per your surgeon, Dr. Ha.    If you are still interested in scheduling surgery/procedure, please call our office and we will be happy to assist you in scheduling. We can be reached at [phone].    We look forward to continuing care with you. As always, we are available at the above telephone number for any questions or concerns you may have.    Sincerely,      [facility] General Surgery Team

## 2022-09-27 NOTE — TELEPHONE ENCOUNTER
Attempts have been made to reach patient to schedule surgery with Dr. Ha, with no return calls. Sending letter.

## 2022-10-01 ENCOUNTER — HEALTH MAINTENANCE LETTER (OUTPATIENT)
Age: 42
End: 2022-10-01

## 2022-12-05 DIAGNOSIS — M1A.0710 CHRONIC IDIOPATHIC GOUT INVOLVING TOE OF RIGHT FOOT WITHOUT TOPHUS: ICD-10-CM

## 2022-12-06 RX ORDER — NAPROXEN 500 MG/1
TABLET ORAL
Qty: 60 TABLET | Refills: 4 | Status: SHIPPED | OUTPATIENT
Start: 2022-12-06

## 2022-12-06 NOTE — TELEPHONE ENCOUNTER
"Routing refill request to provider for review/approval because:  Labs not current:  Multiple    Last Written Prescription Date:  11/29/21  Last Fill Quantity: 60,  # refills: 11   Last office visit provider:  4/25/22     Requested Prescriptions   Pending Prescriptions Disp Refills     naproxen (NAPROSYN) 500 MG tablet [Pharmacy Med Name: NAPROXEN 500MG TABLETS] 60 tablet 11     Sig: TAKE 1 TABLET(500 MG) BY MOUTH TWICE DAILY AS NEEDED FOR MODERATE PAIN       NSAID Medications Failed - 12/6/2022  9:09 AM        Failed - Normal ALT on file in past 12 months     Recent Labs   Lab Test 09/07/21  1529   ALT 35             Failed - Normal AST on file in past 12 months     Recent Labs   Lab Test 09/07/21  1529   AST 27             Failed - Normal CBC on file in past 12 months     Recent Labs   Lab Test 09/07/21  1529   WBC 5.2   RBC 5.07   HGB 15.6   HCT 46.6                    Failed - Normal serum creatinine on file in past 12 months     Recent Labs   Lab Test 09/07/21  1529   CR 0.93       Ok to refill medication if creatinine is low          Passed - Blood pressure under 140/90 in past 12 months     BP Readings from Last 3 Encounters:   05/16/22 122/88                 Passed - Recent (12 mo) or future (30 days) visit within the authorizing provider's specialty     Patient has had an office visit with the authorizing provider or a provider within the authorizing providers department within the previous 12 mos or has a future within next 30 days. See \"Patient Info\" tab in inbasket, or \"Choose Columns\" in Meds & Orders section of the refill encounter.              Passed - Patient is age 6-64 years        Passed - Medication is active on med list             Damon Calles RN 12/06/22 9:09 AM  "

## 2023-01-18 ENCOUNTER — VIRTUAL VISIT (OUTPATIENT)
Dept: INTERNAL MEDICINE | Facility: CLINIC | Age: 43
End: 2023-01-18
Payer: COMMERCIAL

## 2023-01-18 DIAGNOSIS — E78.00 HYPERCHOLESTEROLEMIA: ICD-10-CM

## 2023-01-18 DIAGNOSIS — Z00.00 PREVENTATIVE HEALTH CARE: ICD-10-CM

## 2023-01-18 DIAGNOSIS — R06.83 LOUD SNORING: ICD-10-CM

## 2023-01-18 DIAGNOSIS — Z13.220 LIPID SCREENING: ICD-10-CM

## 2023-01-18 DIAGNOSIS — M1A.0710 CHRONIC IDIOPATHIC GOUT INVOLVING TOE OF RIGHT FOOT WITHOUT TOPHUS: Primary | ICD-10-CM

## 2023-01-18 DIAGNOSIS — K82.8 GALLBLADDER SLUDGE: ICD-10-CM

## 2023-01-18 PROCEDURE — 99214 OFFICE O/P EST MOD 30 MIN: CPT | Mod: GT | Performed by: INTERNAL MEDICINE

## 2023-01-18 RX ORDER — PREDNISONE 20 MG/1
20 TABLET ORAL DAILY
Qty: 20 TABLET | Refills: 1 | Status: SHIPPED | OUTPATIENT
Start: 2023-01-18 | End: 2023-01-22

## 2023-01-18 RX ORDER — ALLOPURINOL 300 MG/1
300 TABLET ORAL DAILY
Qty: 90 TABLET | Refills: 3 | Status: SHIPPED | OUTPATIENT
Start: 2023-01-18 | End: 2024-04-23

## 2023-01-18 RX ORDER — ALLOPURINOL 300 MG/1
300 TABLET ORAL DAILY
COMMUNITY
End: 2023-01-18

## 2023-01-18 NOTE — PROGRESS NOTES
"Naresh is a 42 year old who is being evaluated via a billable video visit.      How would you like to obtain your AVS? MyChart  If the video visit is dropped, the invitation should be resent by: Text to cell phone: [phone]  Will anyone else be joining your video visit? No  {If patient encounters technical issues they should call [phone] :647594}        {PROVIDER CHARTING PREFERENCE:743817}    Subjective   Naresh is a 42 year old{ACCOMPANIED BY STATEMENT (Optional):319887}, presenting for the following health issues:  Recheck Medication (Gout meds and snoring- patient's wife stated he is temp stopping breathing in the middle of the night. )      HPI     {SUPERLIST (Optional):530661}  {additonal problems for provider to add (Optional):336691}    Review of Systems   {ROS COMP (Optional):534454}      Objective           Vitals:  No vitals were obtained today due to virtual visit.    Physical Exam   {video visit exam brief selected:136926::\"GENERAL: Healthy, alert and no distress\",\"EYES: Eyes grossly normal to inspection.  No discharge or erythema, or obvious scleral/conjunctival abnormalities.\",\"RESP: No audible wheeze, cough, or visible cyanosis.  No visible retractions or increased work of breathing.  \",\"SKIN: Visible skin clear. No significant rash, abnormal pigmentation or lesions.\",\"NEURO: Cranial nerves grossly intact.  Mentation and speech appropriate for age.\",\"PSYCH: Mentation appears normal, affect normal/bright, judgement and insight intact, normal speech and appearance well-groomed.\"}    {Diagnostic Test Results (Optional):410753}    {AMBULATORY ATTESTATION (Optional):942358}        Video-Visit Details    Type of service:  Video Visit     Originating Location (pt. Location): {video visit patient location:787456::\"Home\"}  {PROVIDER LOCATION On-site should be selected for visits conducted from your clinic location or adjoining [facility] hospital, academic office, or other nearby [facility] building. Off-site " "should be selected for all other provider locations, including home:[address]6790}  Distant Location (provider location):  {virtual location provider:829485}  Platform used for Video Visit: {Virtual Visit Platforms:640879::\"Well\"}    "

## 2023-01-18 NOTE — PATIENT INSTRUCTIONS
Cholecystectomy when symptomatic    Discontinue daily naproxen take as needed for attacks of gout    Prednisone 20 mg daily for 4 days as needed attacks of gout    Continue allopurinol 300 mg daily    Referral to sleep medicine    Update labs    Preventive care and vaccinations reviewed

## 2023-01-18 NOTE — PROGRESS NOTES
Naresh is a 42 year old male contacting the clinic today via video, who will use the platform: Virtualtwo for the visit.  Phone # for Doximity, or if Amwell drops:   Telephone Information:   Mobile [phone]          ASSESSMENT and PLAN:  1. Chronic idiopathic gout involving toe of right foot without tophus  Resume allopurinol daily.  No attacks.  Recheck labs when stable.  Provide naproxen and prednisone to take for attacks  - allopurinol (ZYLOPRIM) 300 MG tablet; Take 1 tablet (300 mg) by mouth daily  Dispense: 90 tablet; Refill: 3  - Uric acid; Future  - predniSONE (DELTASONE) 20 MG tablet; Take 1 tablet (20 mg) by mouth daily for 4 days  Dispense: 20 tablet; Refill: 1    2. Gallbladder sludge  Reviewed gallbladder ultrasound.  Surgery ready but asymptomatic.  Okay to wait    3. Preventative health care  Reviewed vaccinations    4. Lipid screening  - Comprehensive metabolic panel; Future  - Lipid Profile; Future    5. Loud snoring  No chronic sinus problems.  Referral to sleep medicine  - Adult Sleep Eval & Management  Referral; Future    6. Hypercholesterolemia     Preventive Care Assessed: As above    Patient Instructions   Cholecystectomy when symptomatic    Discontinue daily naproxen take as needed for attacks of gout    Prednisone 20 mg daily for 4 days as needed attacks of gout    Continue allopurinol 300 mg daily    Referral to sleep medicine    Update labs    Preventive care and vaccinations reviewed            Return in about 1 year (around 1/18/2024) for using a video visit.       CHIEF COMPLAINT:  Chief Complaint   Patient presents with     Recheck Medication     Gout meds and snoring- patient's wife stated he is temp stopping breathing in the middle of the night.        History of Present Illness       Reason for visit:  Gout  Symptom onset:  More than a month  Symptoms include:  No symptoms  Symptom intensity:  Mild  Symptom progression:  Staying the same  Had these symptoms before:  Yes  What  "makes it worse:  No  What makes it better:  Medication    He eats 2-3 servings of fruits and vegetables daily.He consumes 1 sweetened beverage(s) daily.He exercises with enough effort to increase his heart rate 30 to 60 minutes per day.  He exercises with enough effort to increase his heart rate 5 days per week.   He is taking medications regularly.      HISTORY OF PRESENT ILLNESS:  Naresh is a 42 year old male contacting the clinic today via video for yearly review.  He takes allopurinol once daily and has not had an attack of gout in the last year.  He has run out and is due for yearly labs and refills.  We discussed taking naproxen as needed and he has been mostly taking it daily without significant pain.  He does not have a prescription for prednisone and we discussed preventive care with allopurinol and treatment of attacks with naproxen and prednisone    Wife has noted heavy snoring.  This worsened when he had a head cold over [place] but otherwise he does not complain of chronic problems.  His father has severe sleep apnea.  His wife notes that he does pause with breathing    Gallbladder sludge was noted by ultrasound last year.  He was referred to general surgery who agreed that surgery could be done but he has had no symptoms and wishes to continue delaying    REVIEW OF SYSTEMS:   Occasional fatigue    PFSH:  Social History     Social History Narrative    Self employed software design        , 1 son       TOBACCO USE:  History   Smoking Status     Never   Smokeless Tobacco     Never       VITALS:  There were no vitals filed for this visit.  There were no vitals taken for this visit. Estimated body mass index is 27.99 kg/m  as calculated from the following:    Height as of 5/16/22: 1.88 m (6' 2\").    Weight as of 5/16/22: 98.9 kg (218 lb).    PHYSICAL EXAM:  (observations via Video)  Alert and oriented    MEDICATIONS:   Current Outpatient Medications   Medication Sig Dispense Refill     allopurinol " (ZYLOPRIM) 300 MG tablet Take 1 tablet (300 mg) by mouth daily 90 tablet 3     cholecalciferol, vitamin D3, 50 mcg (2,000 unit) Tab [CHOLECALCIFEROL, VITAMIN D3, 50 MCG (2,000 UNIT) TAB] Take 1 tablet (2,000 Units total) by mouth daily. 100 each 3     naproxen (NAPROSYN) 500 MG tablet TAKE 1 TABLET(500 MG) BY MOUTH TWICE DAILY AS NEEDED FOR MODERATE PAIN 60 tablet 4     predniSONE (DELTASONE) 20 MG tablet Take 1 tablet (20 mg) by mouth daily for 4 days 20 tablet 1       Outside Notes summarized: Surgical note  Labs, x-rays, cardiology, GI tests reviewed: Gallbladder ultrasound  Recent Labs   Lab Test 09/07/21  1529   HGB 15.6   WBC 5.2      POTASSIUM 4.6   CR 0.93   URIC 6.0   B12 406   VITDT 36   SED 13     No results found for: [id number]  Lab Results   Component Value Date    CHOL 275 09/07/2021     New orders:   Orders Placed This Encounter   Procedures     Uric acid     Comprehensive metabolic panel     Lipid Profile     Adult Sleep Eval & Management  Referral       Independent review of:     Fransisco Frances MD  [facility]    Video-Visit Details  Type of service:  Video Visit  Patient has given verbal consent to a Video visit?  Yes  How would you like to obtain your AVS?  MyChart  Will anyone else be joining your video visit, giving supplemental history? No    Originating location (pt location): Home    Distant Location (provider location):  Off-site    Video Start Time: 3:35 PM  Video End time:  3:58 PM  Face to face plus orders: 23 minutes  Documentation time:  3 minutes     The visit lasted a total of 26 minutes

## 2023-01-20 ENCOUNTER — TELEPHONE (OUTPATIENT)
Dept: INTERNAL MEDICINE | Facility: CLINIC | Age: 43
End: 2023-01-20
Payer: COMMERCIAL

## 2023-02-02 ENCOUNTER — LAB (OUTPATIENT)
Dept: LAB | Facility: CLINIC | Age: 43
End: 2023-02-02
Payer: COMMERCIAL

## 2023-02-02 DIAGNOSIS — M1A.0710 CHRONIC IDIOPATHIC GOUT INVOLVING TOE OF RIGHT FOOT WITHOUT TOPHUS: ICD-10-CM

## 2023-02-02 DIAGNOSIS — Z13.220 LIPID SCREENING: ICD-10-CM

## 2023-02-02 LAB
ALBUMIN SERPL BCG-MCNC: 4.5 G/DL (ref 3.5–5.2)
ALP SERPL-CCNC: 48 U/L (ref 40–129)
ALT SERPL W P-5'-P-CCNC: 25 U/L (ref 10–50)
ANION GAP SERPL CALCULATED.3IONS-SCNC: 11 MMOL/L (ref 7–15)
AST SERPL W P-5'-P-CCNC: 26 U/L (ref 10–50)
BILIRUB SERPL-MCNC: 1.6 MG/DL
BUN SERPL-MCNC: 11.1 MG/DL (ref 6–20)
CALCIUM SERPL-MCNC: 10 MG/DL (ref 8.6–10)
CHLORIDE SERPL-SCNC: 104 MMOL/L (ref 98–107)
CHOLEST SERPL-MCNC: 277 MG/DL
CREAT SERPL-MCNC: 0.9 MG/DL (ref 0.67–1.17)
DEPRECATED HCO3 PLAS-SCNC: 25 MMOL/L (ref 22–29)
GFR SERPL CREATININE-BSD FRML MDRD: >90 ML/MIN/1.73M2
GLUCOSE SERPL-MCNC: 100 MG/DL (ref 70–99)
HDLC SERPL-MCNC: 84 MG/DL
LDLC SERPL CALC-MCNC: 177 MG/DL
NONHDLC SERPL-MCNC: 193 MG/DL
POTASSIUM SERPL-SCNC: 4.6 MMOL/L (ref 3.4–5.3)
PROT SERPL-MCNC: 8.4 G/DL (ref 6.4–8.3)
SODIUM SERPL-SCNC: 140 MMOL/L (ref 136–145)
TRIGL SERPL-MCNC: 82 MG/DL
URATE SERPL-MCNC: 4.6 MG/DL (ref 3.4–7)

## 2023-02-02 PROCEDURE — 80061 LIPID PANEL: CPT

## 2023-02-02 PROCEDURE — 36415 COLL VENOUS BLD VENIPUNCTURE: CPT

## 2023-02-02 PROCEDURE — 80053 COMPREHEN METABOLIC PANEL: CPT

## 2023-02-02 PROCEDURE — 84550 ASSAY OF BLOOD/URIC ACID: CPT

## 2023-10-28 DIAGNOSIS — M1A.0710 CHRONIC IDIOPATHIC GOUT INVOLVING TOE OF RIGHT FOOT WITHOUT TOPHUS: ICD-10-CM

## 2023-10-30 RX ORDER — ALLOPURINOL 300 MG/1
300 TABLET ORAL DAILY
Qty: 90 TABLET | Refills: 3 | OUTPATIENT
Start: 2023-10-30

## 2024-01-04 ENCOUNTER — PATIENT OUTREACH (OUTPATIENT)
Dept: CARE COORDINATION | Facility: CLINIC | Age: 44
End: 2024-01-04
Payer: COMMERCIAL

## 2024-01-18 ENCOUNTER — PATIENT OUTREACH (OUTPATIENT)
Dept: CARE COORDINATION | Facility: CLINIC | Age: 44
End: 2024-01-18
Payer: COMMERCIAL

## 2024-03-03 ENCOUNTER — HEALTH MAINTENANCE LETTER (OUTPATIENT)
Age: 44
End: 2024-03-03

## 2024-04-23 ENCOUNTER — OFFICE VISIT (OUTPATIENT)
Dept: INTERNAL MEDICINE | Facility: CLINIC | Age: 44
End: 2024-04-23
Payer: COMMERCIAL

## 2024-04-23 VITALS
RESPIRATION RATE: 16 BRPM | HEIGHT: 75 IN | OXYGEN SATURATION: 97 % | BODY MASS INDEX: 26.61 KG/M2 | HEART RATE: 60 BPM | DIASTOLIC BLOOD PRESSURE: 72 MMHG | TEMPERATURE: 97.8 F | SYSTOLIC BLOOD PRESSURE: 118 MMHG | WEIGHT: 214 LBS

## 2024-04-23 DIAGNOSIS — Z00.00 PREVENTATIVE HEALTH CARE: Primary | ICD-10-CM

## 2024-04-23 DIAGNOSIS — Z98.52 S/P VASECTOMY: ICD-10-CM

## 2024-04-23 DIAGNOSIS — E78.00 HYPERCHOLESTEROLEMIA: ICD-10-CM

## 2024-04-23 DIAGNOSIS — M1A.0710 CHRONIC IDIOPATHIC GOUT INVOLVING TOE OF RIGHT FOOT WITHOUT TOPHUS: ICD-10-CM

## 2024-04-23 PROCEDURE — 80061 LIPID PANEL: CPT | Performed by: INTERNAL MEDICINE

## 2024-04-23 PROCEDURE — 36415 COLL VENOUS BLD VENIPUNCTURE: CPT | Performed by: INTERNAL MEDICINE

## 2024-04-23 PROCEDURE — 80048 BASIC METABOLIC PNL TOTAL CA: CPT | Performed by: INTERNAL MEDICINE

## 2024-04-23 PROCEDURE — 99396 PREV VISIT EST AGE 40-64: CPT | Performed by: INTERNAL MEDICINE

## 2024-04-23 PROCEDURE — 84550 ASSAY OF BLOOD/URIC ACID: CPT | Performed by: INTERNAL MEDICINE

## 2024-04-23 RX ORDER — ALLOPURINOL 300 MG/1
300 TABLET ORAL DAILY
Qty: 90 TABLET | Refills: 3 | Status: SHIPPED | OUTPATIENT
Start: 2024-04-23

## 2024-04-23 SDOH — HEALTH STABILITY: PHYSICAL HEALTH: ON AVERAGE, HOW MANY DAYS PER WEEK DO YOU ENGAGE IN MODERATE TO STRENUOUS EXERCISE (LIKE A BRISK WALK)?: 5 DAYS

## 2024-04-23 ASSESSMENT — PAIN SCALES - GENERAL: PAINLEVEL: NO PAIN (0)

## 2024-04-23 ASSESSMENT — SOCIAL DETERMINANTS OF HEALTH (SDOH): HOW OFTEN DO YOU GET TOGETHER WITH FRIENDS OR RELATIVES?: TWICE A WEEK

## 2024-04-23 NOTE — PATIENT INSTRUCTIONS
10 year risk is 1% with this cholesterol    American heart assoc recommends statins if risk is over 7.5%    Prostate cancer begins age 50    Colon cancer begins age 45    Vasectomy 2012 by a urologist    Consult with urologist whether they can be reconnected    You can wash out ears periodically

## 2024-04-23 NOTE — PROGRESS NOTES
PREVENTIVE CARE VISIT--Face to Face  Apr 23, 2024    Assessment and Plan:     ASSESSMENT and PLAN:  1. Preventative health care  - REVIEW OF HEALTH MAINTENANCE PROTOCOL ORDERS    2. Hypercholesterolemia  10-year risk less than 1%  - Lipid panel reflex to direct LDL Non-fasting; Future  - Lipid panel reflex to direct LDL Non-fasting    3. Chronic idiopathic gout involving toe of right foot without tophus  Continue same  - allopurinol (ZYLOPRIM) 300 MG tablet; Take 1 tablet (300 mg) by mouth daily  Dispense: 90 tablet; Refill: 3  - Uric acid; Future  - Basic metabolic panel; Future  - Uric acid  - Basic metabolic panel    4. S/P vasectomy  Refer to urology  - Adult Urology  Referral; Future       Patient Instructions   10 year risk is 1% with this cholesterol    American heart assoc recommends statins if risk is over 7.5%    Prostate cancer begins age 50    Colon cancer begins age 45    Vasectomy 2012 by a urologist    Consult with urologist whether they can be reconnected    You can wash out ears periodically            Return in about 1 year (around 4/23/2025) for in person.         Subjective:   Naresh is a 43 year old male who presents to the clinic today for an Annual Wellness Visit.    CHIEF COMPLAINT:  Chief Complaint   Patient presents with    Physical    Recheck Medication     Pt is here for his physical and to discuss other health questions .       HPI: Had vasectomy in 2012.  He and his wife considering another child.  Wonders about options    Worried about cholesterol    No recurrent gout    Review of Systems: Sleeps well    Current Outpatient Medications   Medication Sig Dispense Refill    allopurinol (ZYLOPRIM) 300 MG tablet Take 1 tablet (300 mg) by mouth daily 90 tablet 3    cholecalciferol, vitamin D3, 50 mcg (2,000 unit) Tab [CHOLECALCIFEROL, VITAMIN D3, 50 MCG (2,000 UNIT) TAB] Take 1 tablet (2,000 Units total) by mouth daily. 100 each 3    naproxen (NAPROSYN) 500 MG tablet TAKE 1 TABLET(500  "MG) BY MOUTH TWICE DAILY AS NEEDED FOR MODERATE PAIN 60 tablet 4       Objective:   Exam  /72 (BP Location: Left arm, Patient Position: Sitting, Cuff Size: Adult Regular)   Pulse 60   Temp 97.8  F (36.6  C) (Tympanic)   Resp 16   Ht 1.892 m (6' 2.5\")   Wt 97.1 kg (214 lb)   SpO2 97%   BMI 27.11 kg/m     Estimated body mass index is 27.11 kg/m  as calculated from the following:    Height as of this encounter: 1.892 m (6' 2.5\").    Weight as of this encounter: 97.1 kg (214 lb).    PHYSICAL EXAM:  Wearing mask when entering room?  No  Constitutional:  Reveals pleasant muscular man who ambulates without difficulty  Palpation of radial pulse regular   Ears: Bilaterally occluded with cerumen which appears soft  Thyroid:  Non palpable   Cardiac:  Regular rate and rhythm   Lungs: Clear.  Respiratory effort normal.  Edema of Legs: None   Psychiatric:  Alert and oriented   Mallampati class 1    Cognitive Screening   Completely normal to conversational questioning        Recent Labs   Lab Test 02/02/23  1143   CHOL 277*   *       Preventive Care Documentation    Patient Active Problem List   Diagnosis    Testicular Hydrocele    Fatty liver    Gallbladder sludge    Nephrolithiasis    Chronic idiopathic gout involving toe of right foot without tophus    Uric acid arthropathy    Hypercholesterolemia    S/P vasectomy     No past medical history on file.   Past Surgical History:   Procedure Laterality Date    VASECTOMY  11/2012      Family History   Problem Relation Age of Onset    Throat cancer Maternal Grandfather     Coronary Artery Disease Paternal Grandfather       Social History     Socioeconomic History    Marital status:      Spouse name: Not on file    Number of children: Not on file    Years of education: Not on file    Highest education level: Not on file   Occupational History    Not on file   Tobacco Use    Smoking status: Never    Smokeless tobacco: Never   Substance and Sexual Activity    " Alcohol use: Yes     Alcohol/week: 6.0 standard drinks of alcohol    Drug use: No    Sexual activity: Yes     Partners: Female   Other Topics Concern    Not on file   Social History Narrative    Self employed software design        , 1 son     Social Determinants of Health     Financial Resource Strain: Low Risk  (4/23/2024)    Financial Resource Strain     Within the past 12 months, have you or your family members you live with been unable to get utilities (heat, electricity) when it was really needed?: No   Food Insecurity: Low Risk  (4/23/2024)    Food Insecurity     Within the past 12 months, did you worry that your food would run out before you got money to buy more?: No     Within the past 12 months, did the food you bought just not last and you didn t have money to get more?: No   Transportation Needs: Low Risk  (4/23/2024)    Transportation Needs     Within the past 12 months, has lack of transportation kept you from medical appointments, getting your medicines, non-medical meetings or appointments, work, or from getting things that you need?: No   Physical Activity: Unknown (4/23/2024)    Exercise Vital Sign     Days of Exercise per Week: 5 days     Minutes of Exercise per Session: Not on file   Stress: Stress Concern Present (4/23/2024)    [employer] - Occupational Stress Questionnaire     Feeling of Stress : To some extent   Social Connections: Unknown (4/23/2024)    Social Connection and Isolation Panel [NHANES]     Frequency of Communication with Friends and Family: Not on file     Frequency of Social Gatherings with Friends and Family: Twice a week     Attends Tenriism Services: Not on file     Active Member of Clubs or Organizations: Not on file     Attends Club or Organization Meetings: Not on file     Marital Status: Not on file   Interpersonal Safety: Low Risk  (4/23/2024)    Interpersonal Safety     Do you feel physically and emotionally safe where you  currently live?: Yes     Within the past 12 months, have you been hit, slapped, kicked or otherwise physically hurt by someone?: No     Within the past 12 months, have you been humiliated or emotionally abused in other ways by your partner or ex-partner?: No   Housing Stability: Low Risk  (4/23/2024)    Housing Stability     Do you have housing? : Yes     Are you worried about losing your housing?: No            4/23/2024    11:51 AM   Additional Questions   Roomed by eden   Accompanied by alone         4/23/2024    11:51 AM   Patient Reported Additional Medications   Patient reports taking the following new medications none        Health Care Directive  Patient does not have a Health Care Directive or Living Will: Discussed advance care planning with patient; however, patient declined at this time.         No data to display                     4/23/2024   General Health   How would you rate your overall physical health? Good   Feel stress (tense, anxious, or unable to sleep) To some extent   (!) STRESS CONCERN      4/23/2024   Nutrition   Three or more servings of calcium each day? (!) I DON'T KNOW   Diet: Breakfast skipped    I don't know   How many servings of fruit and vegetables per day? (!) 2-3   How many sweetened beverages each day? (!) 2         4/23/2024   Exercise   Days per week of moderate/strenous exercise 5 days         4/23/2024   Social Factors   Frequency of gathering with friends or relatives Twice a week   Worry food won't last until get money to buy more No   Food not last or not have enough money for food? No   Do you have housing?  Yes   Are you worried about losing your housing? No   Lack of transportation? No   Unable to get utilities (heat,electricity)? No              No data to display                    4/23/2024   Dental   Dentist two times every year? (!) NO         4/23/2024   TB Screening   Were you born outside of the [place]? No         Today's PHQ-2 Score:       4/23/2024    11:52  AM   PHQ-2 ( 1999 [employer])   Q1: Little interest or pleasure in doing things 0   Q2: Feeling down, depressed or hopeless 1   PHQ-2 Score 1   Q1: Little interest or pleasure in doing things Not at all   Q2: Feeling down, depressed or hopeless Several days   PHQ-2 Score 1           4/23/2024   Substance Use   Alcohol more than 3/day or more than 7/wk Yes   How often do you have a drink containing alcohol 4 or more times a week   How many alcohol drinks on typical day 1 or 2   How often do you have 5+ drinks at one occasion Monthly   Audit 2/3 Score 2   How often not able to stop drinking once started Less than monthly   How often failed to do what normally expected Never   How often needed first drink in am after a heavy drinking session Never   How often feeling of guilt or remorse after drinking Less than monthly   How often unable to remember what happened the night before Never   Have you or someone else been injured because of your drinking No   Has anyone been concerned or suggested you cut down on drinking No   TOTAL SCORE - AUDIT 8   Do you use any other substances recreationally? No     Social History     Tobacco Use    Smoking status: Never    Smokeless tobacco: Never   Substance Use Topics    Alcohol use: Yes     Alcohol/week: 6.0 standard drinks of alcohol    Drug use: No           4/23/2024   STI Screening   New sexual partner(s) since last STI/HIV test? No   ASCVD Risk   The 10-year ASCVD risk score (Madie WALLACE, et al., 2019) is: 1%    Values used to calculate the score:      Age: 43 years      Sex: Male      Is Non- : No      Diabetic: No      Tobacco smoker: No      Systolic Blood Pressure: 118 mmHg      Is BP treated: No      HDL Cholesterol: 84 mg/dL      Total Cholesterol: 277 mg/dL      Reviewed and updated as needed this visit by Provider     Meds                  VisionScreening:  Last done yearly    Reviewed and updated as needed this visit by clinical staff    Tobacco  Allergies  Meds              Start Time: 12:16 PM  End time:  12:36 PM  Face to face plus orders:  20 minutes  Documentation time:  3 minutes    The visit lasted a total of 23 minutes     Fransisco Frances MD  Internal Medicine  [facility]

## 2024-04-24 LAB
ANION GAP SERPL CALCULATED.3IONS-SCNC: 9 MMOL/L (ref 7–15)
BUN SERPL-MCNC: 14.4 MG/DL (ref 6–20)
CALCIUM SERPL-MCNC: 10 MG/DL (ref 8.6–10)
CHLORIDE SERPL-SCNC: 103 MMOL/L (ref 98–107)
CHOLEST SERPL-MCNC: 295 MG/DL
CREAT SERPL-MCNC: 0.95 MG/DL (ref 0.67–1.17)
DEPRECATED HCO3 PLAS-SCNC: 26 MMOL/L (ref 22–29)
EGFRCR SERPLBLD CKD-EPI 2021: >90 ML/MIN/1.73M2
FASTING STATUS PATIENT QL REPORTED: NO
GLUCOSE SERPL-MCNC: 90 MG/DL (ref 70–99)
HDLC SERPL-MCNC: 67 MG/DL
LDLC SERPL CALC-MCNC: 198 MG/DL
NONHDLC SERPL-MCNC: 228 MG/DL
POTASSIUM SERPL-SCNC: 4.2 MMOL/L (ref 3.4–5.3)
SODIUM SERPL-SCNC: 138 MMOL/L (ref 135–145)
TRIGL SERPL-MCNC: 151 MG/DL
URATE SERPL-MCNC: 7 MG/DL (ref 3.4–7)

## 2025-03-24 ENCOUNTER — PATIENT OUTREACH (OUTPATIENT)
Dept: CARE COORDINATION | Facility: CLINIC | Age: 45
End: 2025-03-24
Payer: COMMERCIAL

## 2025-04-07 ENCOUNTER — PATIENT OUTREACH (OUTPATIENT)
Dept: CARE COORDINATION | Facility: CLINIC | Age: 45
End: 2025-04-07
Payer: COMMERCIAL

## 2025-06-08 ENCOUNTER — HEALTH MAINTENANCE LETTER (OUTPATIENT)
Age: 45
End: 2025-06-08